# Patient Record
Sex: FEMALE | Race: WHITE | URBAN - METROPOLITAN AREA
[De-identification: names, ages, dates, MRNs, and addresses within clinical notes are randomized per-mention and may not be internally consistent; named-entity substitution may affect disease eponyms.]

---

## 2018-06-19 ENCOUNTER — EMERGENCY (EMERGENCY)
Facility: HOSPITAL | Age: 48
LOS: 0 days | Discharge: HOME | End: 2018-06-19
Admitting: PHYSICIAN ASSISTANT

## 2018-06-19 VITALS
HEART RATE: 82 BPM | TEMPERATURE: 98 F | SYSTOLIC BLOOD PRESSURE: 143 MMHG | RESPIRATION RATE: 20 BRPM | DIASTOLIC BLOOD PRESSURE: 77 MMHG | OXYGEN SATURATION: 99 %

## 2018-06-19 DIAGNOSIS — L23.7 ALLERGIC CONTACT DERMATITIS DUE TO PLANTS, EXCEPT FOOD: ICD-10-CM

## 2018-06-19 DIAGNOSIS — R21 RASH AND OTHER NONSPECIFIC SKIN ERUPTION: ICD-10-CM

## 2018-06-19 RX ORDER — HYDROCORTISONE 1 %
1 OINTMENT (GRAM) TOPICAL
Qty: 50 | Refills: 0 | OUTPATIENT
Start: 2018-06-19

## 2018-06-19 RX ORDER — FAMOTIDINE 10 MG/ML
20 INJECTION INTRAVENOUS DAILY
Qty: 0 | Refills: 0 | Status: DISCONTINUED | OUTPATIENT
Start: 2018-06-19 | End: 2018-06-19

## 2018-06-19 RX ADMIN — FAMOTIDINE 20 MILLIGRAM(S): 10 INJECTION INTRAVENOUS at 07:45

## 2018-06-19 RX ADMIN — Medication 50 MILLIGRAM(S): at 07:45

## 2018-06-19 NOTE — ED PROVIDER NOTE - NS ED ROS FT
CONST: No fever, chills or bodyaches  EYES: No pain, redness, drainage or visual changes.  ENT: No ear pain or discharge, nasal discharge or congestion. No sore throat  CARD: No chest pain, palpitations  RESP: No SOB, cough, hemoptysis. No hx of asthma or COPD  GI: No abdominal pain, N/V/D  MS: No joint pain, back pain or extremity pain/injury  SKIN: +Rash  NEURO: No headache, dizziness, paresthesias or LOC

## 2018-06-19 NOTE — ED PROVIDER NOTE - PHYSICAL EXAMINATION
CONST: Well appearing in NAD  EYES: PERRL, EOMI, Sclera and conjunctiva clear.  ENT: No nasal discharge. TM's clear B/L without drainage. Oropharynx normal appearing, no erythema or exudates. Uvula midline.  NECK: Non-tender, no meningeal signs  CARD: Normal S1 S2; Normal rate and rhythm  RESP: Equal BS B/L, No wheezes, rhonchi or rales. No distress  SKIN: +pustular rash to BL wrists and neck

## 2018-06-19 NOTE — ED PROVIDER NOTE - OBJECTIVE STATEMENT
Pt is a 49 yo F c/o poison ivy to BL wrists and neck after weeding her garden two days ago. Pt has known allergy to poison ivy and sts she usually gets steroids which helps. No fever or chills. No throat swelling or SOB.

## 2018-08-24 ENCOUNTER — EMERGENCY (EMERGENCY)
Facility: HOSPITAL | Age: 48
LOS: 0 days | Discharge: HOME | End: 2018-08-24
Attending: STUDENT IN AN ORGANIZED HEALTH CARE EDUCATION/TRAINING PROGRAM | Admitting: STUDENT IN AN ORGANIZED HEALTH CARE EDUCATION/TRAINING PROGRAM

## 2018-08-24 VITALS
TEMPERATURE: 97 F | OXYGEN SATURATION: 99 % | SYSTOLIC BLOOD PRESSURE: 108 MMHG | HEART RATE: 68 BPM | DIASTOLIC BLOOD PRESSURE: 58 MMHG | RESPIRATION RATE: 18 BRPM

## 2018-08-24 DIAGNOSIS — R10.13 EPIGASTRIC PAIN: ICD-10-CM

## 2018-08-24 DIAGNOSIS — Z90.721 ACQUIRED ABSENCE OF OVARIES, UNILATERAL: ICD-10-CM

## 2018-08-24 DIAGNOSIS — D17.79 BENIGN LIPOMATOUS NEOPLASM OF OTHER SITES: ICD-10-CM

## 2018-08-24 DIAGNOSIS — Z90.49 ACQUIRED ABSENCE OF OTHER SPECIFIED PARTS OF DIGESTIVE TRACT: ICD-10-CM

## 2018-08-24 DIAGNOSIS — R10.31 RIGHT LOWER QUADRANT PAIN: ICD-10-CM

## 2018-08-24 DIAGNOSIS — R11.0 NAUSEA: ICD-10-CM

## 2018-08-24 DIAGNOSIS — R10.9 UNSPECIFIED ABDOMINAL PAIN: ICD-10-CM

## 2018-08-24 DIAGNOSIS — Z79.899 OTHER LONG TERM (CURRENT) DRUG THERAPY: ICD-10-CM

## 2018-08-24 LAB
ALBUMIN SERPL ELPH-MCNC: 4.6 G/DL — SIGNIFICANT CHANGE UP (ref 3.5–5.2)
ALP SERPL-CCNC: 54 U/L — SIGNIFICANT CHANGE UP (ref 30–115)
ALT FLD-CCNC: 23 U/L — SIGNIFICANT CHANGE UP (ref 0–41)
ANION GAP SERPL CALC-SCNC: 15 MMOL/L — HIGH (ref 7–14)
APPEARANCE UR: ABNORMAL
AST SERPL-CCNC: 15 U/L — SIGNIFICANT CHANGE UP (ref 0–41)
BILIRUB SERPL-MCNC: 0.5 MG/DL — SIGNIFICANT CHANGE UP (ref 0.2–1.2)
BILIRUB UR-MCNC: NEGATIVE — SIGNIFICANT CHANGE UP
BUN SERPL-MCNC: 13 MG/DL — SIGNIFICANT CHANGE UP (ref 10–20)
CALCIUM SERPL-MCNC: 9.8 MG/DL — SIGNIFICANT CHANGE UP (ref 8.5–10.1)
CHLORIDE SERPL-SCNC: 100 MMOL/L — SIGNIFICANT CHANGE UP (ref 98–110)
CO2 SERPL-SCNC: 24 MMOL/L — SIGNIFICANT CHANGE UP (ref 17–32)
COLOR SPEC: YELLOW — SIGNIFICANT CHANGE UP
CREAT SERPL-MCNC: 0.8 MG/DL — SIGNIFICANT CHANGE UP (ref 0.7–1.5)
DIFF PNL FLD: NEGATIVE — SIGNIFICANT CHANGE UP
GAS PNL BLDV: SIGNIFICANT CHANGE UP
GLUCOSE SERPL-MCNC: 94 MG/DL — SIGNIFICANT CHANGE UP (ref 70–99)
GLUCOSE UR QL: NEGATIVE MG/DL — SIGNIFICANT CHANGE UP
HCT VFR BLD CALC: 38.9 % — SIGNIFICANT CHANGE UP (ref 37–47)
HGB BLD-MCNC: 13 G/DL — SIGNIFICANT CHANGE UP (ref 12–16)
KETONES UR-MCNC: NEGATIVE — SIGNIFICANT CHANGE UP
LEUKOCYTE ESTERASE UR-ACNC: NEGATIVE — SIGNIFICANT CHANGE UP
LIDOCAIN IGE QN: 23 U/L — SIGNIFICANT CHANGE UP (ref 7–60)
MCHC RBC-ENTMCNC: 29.5 PG — SIGNIFICANT CHANGE UP (ref 27–31)
MCHC RBC-ENTMCNC: 33.4 G/DL — SIGNIFICANT CHANGE UP (ref 32–37)
MCV RBC AUTO: 88.2 FL — SIGNIFICANT CHANGE UP (ref 81–99)
NITRITE UR-MCNC: NEGATIVE — SIGNIFICANT CHANGE UP
NRBC # BLD: 0 /100 WBCS — SIGNIFICANT CHANGE UP (ref 0–0)
PH UR: 6 — SIGNIFICANT CHANGE UP (ref 5–8)
PLATELET # BLD AUTO: 233 K/UL — SIGNIFICANT CHANGE UP (ref 130–400)
POTASSIUM SERPL-MCNC: 4.2 MMOL/L — SIGNIFICANT CHANGE UP (ref 3.5–5)
POTASSIUM SERPL-SCNC: 4.2 MMOL/L — SIGNIFICANT CHANGE UP (ref 3.5–5)
PROT SERPL-MCNC: 6.9 G/DL — SIGNIFICANT CHANGE UP (ref 6–8)
PROT UR-MCNC: NEGATIVE MG/DL — SIGNIFICANT CHANGE UP
RBC # BLD: 4.41 M/UL — SIGNIFICANT CHANGE UP (ref 4.2–5.4)
RBC # FLD: 12.8 % — SIGNIFICANT CHANGE UP (ref 11.5–14.5)
SODIUM SERPL-SCNC: 139 MMOL/L — SIGNIFICANT CHANGE UP (ref 135–146)
SP GR SPEC: 1.02 — SIGNIFICANT CHANGE UP (ref 1.01–1.03)
UROBILINOGEN FLD QL: 0.2 MG/DL — SIGNIFICANT CHANGE UP (ref 0.2–0.2)
WBC # BLD: 6.61 K/UL — SIGNIFICANT CHANGE UP (ref 4.8–10.8)
WBC # FLD AUTO: 6.61 K/UL — SIGNIFICANT CHANGE UP (ref 4.8–10.8)

## 2018-08-24 RX ORDER — FAMOTIDINE 10 MG/ML
20 INJECTION INTRAVENOUS ONCE
Qty: 0 | Refills: 0 | Status: COMPLETED | OUTPATIENT
Start: 2018-08-24 | End: 2018-08-24

## 2018-08-24 RX ORDER — SODIUM CHLORIDE 9 MG/ML
1000 INJECTION, SOLUTION INTRAVENOUS ONCE
Qty: 0 | Refills: 0 | Status: COMPLETED | OUTPATIENT
Start: 2018-08-24 | End: 2018-08-24

## 2018-08-24 RX ADMIN — SODIUM CHLORIDE 1000 MILLILITER(S): 9 INJECTION, SOLUTION INTRAVENOUS at 10:54

## 2018-08-24 RX ADMIN — SODIUM CHLORIDE 1000 MILLILITER(S): 9 INJECTION, SOLUTION INTRAVENOUS at 13:37

## 2018-08-24 NOTE — ED PROVIDER NOTE - OBJECTIVE STATEMENT
47yo F hx cholecystectomy R oopherectomy otherwise healthy pw RLQ pain- started 2hrs ago, constant, radiates to R flank and epigastrium, +nausea, no vomiting- no fevers/chills, chest pain, shortness of breath, other abdominal surgeries, no dysuria/hematuria, hx nephrolithiasis

## 2018-08-24 NOTE — ED PROVIDER NOTE - ATTENDING CONTRIBUTION TO CARE
48 hx choly, oophorectomy c/o R flank pain. pain flank sometimes to RUQ. no fever, no chills. nausea w/o vomiting. no urinary sx. no hx renal colic.    vss  exam  uncomfortable appearing  card-rrr  lungs-ctab  abdo- epigastric and RLQ tednerness, no cvat      belly labs, ctap, gi cocktail

## 2018-08-24 NOTE — ED PROVIDER NOTE - PHYSICAL EXAMINATION
Uncomfortable appearing. NCAT EOMI conjunctiva nml. No nasal discharge. MMM. Neck supple, non tender, full ROM. RRR no MRG +S1S2. CTA b/l. Abd s +RLQ and epigastric ttp no rebound no guarding ND +BS. no CVA tenderness b/l Ext WWP x4, moving all extremities, no edema. 2+ equal pulses throughout. Cooperative, appropriate.

## 2018-08-24 NOTE — ED PROVIDER NOTE - PROGRESS NOTE DETAILS
Pt aware of all results, given a copy of all results, comfortable with d/c and f/u outpatient, return precautions given, no further questions or concerns at this time

## 2018-08-24 NOTE — ED PROVIDER NOTE - NS ED ROS FT
Constitutional:  See HPI.   Eyes:  No visual changes, eye pain or discharge.  ENMT:  No hearing changes, pain, discharge or infections. No neck pain or stiffness.  Cardiac:  No chest pain, SOB or edema. No chest pain with exertion.  Respiratory:  No cough or respiratory distress. No hemoptysis.  GI:  No vomiting, diarrhea  :  No dysuria, frequency, hematuria  MS:  No joint pain   Neuro:  No LOC. No headache or weakness.    Skin:  No skin rash.

## 2018-08-24 NOTE — ED ADULT NURSE NOTE - NSIMPLEMENTINTERV_GEN_ALL_ED
Implemented All Universal Safety Interventions:  [place] to call system. Call bell, personal items and telephone within reach. Instruct patient to call for assistance. Room bathroom lighting operational. Non-slip footwear when patient is off stretcher. Physically safe environment: no spills, clutter or unnecessary equipment. Stretcher in lowest position, wheels locked, appropriate side rails in place.

## 2018-08-25 LAB
CULTURE RESULTS: SIGNIFICANT CHANGE UP
SPECIMEN SOURCE: SIGNIFICANT CHANGE UP

## 2019-03-23 ENCOUNTER — OUTPATIENT (OUTPATIENT)
Dept: OUTPATIENT SERVICES | Facility: HOSPITAL | Age: 49
LOS: 1 days | Discharge: HOME | End: 2019-03-23

## 2019-03-23 DIAGNOSIS — Z12.31 ENCOUNTER FOR SCREENING MAMMOGRAM FOR MALIGNANT NEOPLASM OF BREAST: ICD-10-CM

## 2019-03-25 DIAGNOSIS — F17.200 NICOTINE DEPENDENCE, UNSPECIFIED, UNCOMPLICATED: ICD-10-CM

## 2019-03-25 DIAGNOSIS — N95.9 UNSPECIFIED MENOPAUSAL AND PERIMENOPAUSAL DISORDER: ICD-10-CM

## 2019-03-25 DIAGNOSIS — Z13.820 ENCOUNTER FOR SCREENING FOR OSTEOPOROSIS: ICD-10-CM

## 2019-04-11 ENCOUNTER — OUTPATIENT (OUTPATIENT)
Dept: OUTPATIENT SERVICES | Facility: HOSPITAL | Age: 49
LOS: 1 days | Discharge: HOME | End: 2019-04-11
Payer: COMMERCIAL

## 2019-04-11 DIAGNOSIS — R92.8 OTHER ABNORMAL AND INCONCLUSIVE FINDINGS ON DIAGNOSTIC IMAGING OF BREAST: ICD-10-CM

## 2019-04-11 PROCEDURE — 77065 DX MAMMO INCL CAD UNI: CPT | Mod: 26,LT

## 2019-04-11 PROCEDURE — G0279: CPT | Mod: 26,LT

## 2019-04-11 PROCEDURE — 76642 ULTRASOUND BREAST LIMITED: CPT | Mod: 26,LT

## 2019-06-02 ENCOUNTER — INPATIENT (INPATIENT)
Facility: HOSPITAL | Age: 49
LOS: 0 days | Discharge: HOME | End: 2019-06-03
Attending: INTERNAL MEDICINE | Admitting: INTERNAL MEDICINE
Payer: COMMERCIAL

## 2019-06-02 VITALS
SYSTOLIC BLOOD PRESSURE: 133 MMHG | RESPIRATION RATE: 20 BRPM | HEART RATE: 100 BPM | OXYGEN SATURATION: 97 % | TEMPERATURE: 100 F | DIASTOLIC BLOOD PRESSURE: 74 MMHG

## 2019-06-02 DIAGNOSIS — Z90.49 ACQUIRED ABSENCE OF OTHER SPECIFIED PARTS OF DIGESTIVE TRACT: Chronic | ICD-10-CM

## 2019-06-02 LAB
ANION GAP SERPL CALC-SCNC: 15 MMOL/L — HIGH (ref 7–14)
BASOPHILS # BLD AUTO: 0.02 K/UL — SIGNIFICANT CHANGE UP (ref 0–0.2)
BASOPHILS NFR BLD AUTO: 0.2 % — SIGNIFICANT CHANGE UP (ref 0–1)
BUN SERPL-MCNC: 12 MG/DL — SIGNIFICANT CHANGE UP (ref 10–20)
CALCIUM SERPL-MCNC: 9.4 MG/DL — SIGNIFICANT CHANGE UP (ref 8.5–10.1)
CHLORIDE SERPL-SCNC: 103 MMOL/L — SIGNIFICANT CHANGE UP (ref 98–110)
CO2 SERPL-SCNC: 24 MMOL/L — SIGNIFICANT CHANGE UP (ref 17–32)
CREAT SERPL-MCNC: 0.9 MG/DL — SIGNIFICANT CHANGE UP (ref 0.7–1.5)
EOSINOPHIL # BLD AUTO: 0.33 K/UL — SIGNIFICANT CHANGE UP (ref 0–0.7)
EOSINOPHIL NFR BLD AUTO: 3.8 % — SIGNIFICANT CHANGE UP (ref 0–8)
GLUCOSE SERPL-MCNC: 112 MG/DL — HIGH (ref 70–99)
HCT VFR BLD CALC: 41.1 % — SIGNIFICANT CHANGE UP (ref 37–47)
HGB BLD-MCNC: 13.8 G/DL — SIGNIFICANT CHANGE UP (ref 12–16)
IMM GRANULOCYTES NFR BLD AUTO: 0.5 % — HIGH (ref 0.1–0.3)
LACTATE SERPL-SCNC: 2.3 MMOL/L — HIGH (ref 0.5–2.2)
LYMPHOCYTES # BLD AUTO: 1.12 K/UL — LOW (ref 1.2–3.4)
LYMPHOCYTES # BLD AUTO: 12.8 % — LOW (ref 20.5–51.1)
MCHC RBC-ENTMCNC: 29.7 PG — SIGNIFICANT CHANGE UP (ref 27–31)
MCHC RBC-ENTMCNC: 33.6 G/DL — SIGNIFICANT CHANGE UP (ref 32–37)
MCV RBC AUTO: 88.6 FL — SIGNIFICANT CHANGE UP (ref 81–99)
MONOCYTES # BLD AUTO: 0.74 K/UL — HIGH (ref 0.1–0.6)
MONOCYTES NFR BLD AUTO: 8.5 % — SIGNIFICANT CHANGE UP (ref 1.7–9.3)
NEUTROPHILS # BLD AUTO: 6.5 K/UL — SIGNIFICANT CHANGE UP (ref 1.4–6.5)
NEUTROPHILS NFR BLD AUTO: 74.2 % — SIGNIFICANT CHANGE UP (ref 42.2–75.2)
NRBC # BLD: 0 /100 WBCS — SIGNIFICANT CHANGE UP (ref 0–0)
PLATELET # BLD AUTO: 211 K/UL — SIGNIFICANT CHANGE UP (ref 130–400)
POTASSIUM SERPL-MCNC: 4.4 MMOL/L — SIGNIFICANT CHANGE UP (ref 3.5–5)
POTASSIUM SERPL-SCNC: 4.4 MMOL/L — SIGNIFICANT CHANGE UP (ref 3.5–5)
RBC # BLD: 4.64 M/UL — SIGNIFICANT CHANGE UP (ref 4.2–5.4)
RBC # FLD: 13 % — SIGNIFICANT CHANGE UP (ref 11.5–14.5)
SODIUM SERPL-SCNC: 142 MMOL/L — SIGNIFICANT CHANGE UP (ref 135–146)
WBC # BLD: 8.75 K/UL — SIGNIFICANT CHANGE UP (ref 4.8–10.8)
WBC # FLD AUTO: 8.75 K/UL — SIGNIFICANT CHANGE UP (ref 4.8–10.8)

## 2019-06-02 PROCEDURE — 99284 EMERGENCY DEPT VISIT MOD MDM: CPT

## 2019-06-02 PROCEDURE — 93010 ELECTROCARDIOGRAM REPORT: CPT

## 2019-06-02 PROCEDURE — 73080 X-RAY EXAM OF ELBOW: CPT | Mod: 26,RT

## 2019-06-02 RX ORDER — SODIUM CHLORIDE 9 MG/ML
3 INJECTION INTRAMUSCULAR; INTRAVENOUS; SUBCUTANEOUS EVERY 8 HOURS
Refills: 0 | Status: DISCONTINUED | OUTPATIENT
Start: 2019-06-02 | End: 2019-06-03

## 2019-06-02 RX ORDER — ACETAMINOPHEN 500 MG
975 TABLET ORAL ONCE
Refills: 0 | Status: COMPLETED | OUTPATIENT
Start: 2019-06-02 | End: 2019-06-02

## 2019-06-02 RX ORDER — ENOXAPARIN SODIUM 100 MG/ML
40 INJECTION SUBCUTANEOUS DAILY
Refills: 0 | Status: DISCONTINUED | OUTPATIENT
Start: 2019-06-02 | End: 2019-06-03

## 2019-06-02 RX ORDER — IBUPROFEN 200 MG
600 TABLET ORAL ONCE
Refills: 0 | Status: COMPLETED | OUTPATIENT
Start: 2019-06-02 | End: 2019-06-02

## 2019-06-02 RX ORDER — AMPICILLIN SODIUM AND SULBACTAM SODIUM 250; 125 MG/ML; MG/ML
3 INJECTION, POWDER, FOR SUSPENSION INTRAMUSCULAR; INTRAVENOUS ONCE
Refills: 0 | Status: COMPLETED | OUTPATIENT
Start: 2019-06-02 | End: 2019-06-02

## 2019-06-02 RX ORDER — PANTOPRAZOLE SODIUM 20 MG/1
40 TABLET, DELAYED RELEASE ORAL
Refills: 0 | Status: DISCONTINUED | OUTPATIENT
Start: 2019-06-02 | End: 2019-06-03

## 2019-06-02 RX ORDER — SODIUM CHLORIDE 9 MG/ML
1000 INJECTION INTRAMUSCULAR; INTRAVENOUS; SUBCUTANEOUS ONCE
Refills: 0 | Status: COMPLETED | OUTPATIENT
Start: 2019-06-02 | End: 2019-06-02

## 2019-06-02 RX ORDER — SODIUM CHLORIDE 9 MG/ML
1000 INJECTION INTRAMUSCULAR; INTRAVENOUS; SUBCUTANEOUS
Refills: 0 | Status: DISCONTINUED | OUTPATIENT
Start: 2019-06-02 | End: 2019-06-03

## 2019-06-02 RX ADMIN — SODIUM CHLORIDE 1000 MILLILITER(S): 9 INJECTION INTRAMUSCULAR; INTRAVENOUS; SUBCUTANEOUS at 19:20

## 2019-06-02 RX ADMIN — SODIUM CHLORIDE 50 MILLILITER(S): 9 INJECTION INTRAMUSCULAR; INTRAVENOUS; SUBCUTANEOUS at 23:34

## 2019-06-02 RX ADMIN — Medication 600 MILLIGRAM(S): at 19:00

## 2019-06-02 RX ADMIN — SODIUM CHLORIDE 1000 MILLILITER(S): 9 INJECTION INTRAMUSCULAR; INTRAVENOUS; SUBCUTANEOUS at 19:18

## 2019-06-02 RX ADMIN — Medication 975 MILLIGRAM(S): at 19:20

## 2019-06-02 RX ADMIN — SODIUM CHLORIDE 3 MILLILITER(S): 9 INJECTION INTRAMUSCULAR; INTRAVENOUS; SUBCUTANEOUS at 19:19

## 2019-06-02 RX ADMIN — AMPICILLIN SODIUM AND SULBACTAM SODIUM 3 GRAM(S): 250; 125 INJECTION, POWDER, FOR SUSPENSION INTRAMUSCULAR; INTRAVENOUS at 19:19

## 2019-06-02 RX ADMIN — Medication 975 MILLIGRAM(S): at 19:15

## 2019-06-02 RX ADMIN — AMPICILLIN SODIUM AND SULBACTAM SODIUM 200 GRAM(S): 250; 125 INJECTION, POWDER, FOR SUSPENSION INTRAMUSCULAR; INTRAVENOUS at 18:11

## 2019-06-02 RX ADMIN — Medication 600 MILLIGRAM(S): at 17:34

## 2019-06-02 NOTE — ED PROVIDER NOTE - OBJECTIVE STATEMENT
48 y/o female no PMhx presents to the ED c/o "I was cleaning weeds in my backyard 2 days ago. I noticed blisters to my right forearm yesterday. I tried to use alcohol to make it better. Today the redness and swelling got much worse. I have streaking going up my arm. I had fever 100.9 and chills today." no numbness/ tingling/ trauma

## 2019-06-02 NOTE — ED PROVIDER NOTE - CLINICAL SUMMARY MEDICAL DECISION MAKING FREE TEXT BOX
RUE poison ivy rash w/superimposed cellulitis & lymphangitis - pt febrile, elevated LA - admitted for IV abx and further mgmt

## 2019-06-02 NOTE — H&P ADULT - ATTENDING COMMENTS
pt seen and examined indep, I have read and agree with above,    rue ant arm with erythema and bullous lesions  contact dermatiis  poisin ivy    po prednisone  dc home d/ who  fu pmd

## 2019-06-02 NOTE — H&P ADULT - ASSESSMENT
50 y/o FM w/ PMhx of Migraines presents with 1 day hx of rash with bullous lesions in her right elbow. Pt states that she was attending to her garden yesterday morning which contains poison ivy and she accidentally brushed her arm against it and on the same day evening developed a rash w/ bullous lesion draining serous fluid in her right elbow. she also noticed a fever of 100.9F at home. she also noticed surrounding inflammation in the right arm which brought her to the hospital. Denies vomiting, diarrhea, chills, sob, dizziness, confusion, chest pain, abdominal pain or lethargy. Pt states she cleaned the site with rubbing alcohol    #Right upper extremity bullous rash w/o lymphadenopathy or streaking - likely Toxicodendron  - no evidence of sepsis  - LA 2.3 - possibly d/t hypovolemia vs allergic reaction - Given IV hydration  - will start pt on prednisone. Uptodate states a slow taper is preferable - 60 mg/day the first week, 40 mg/day the second week, 20 mg/day the third week would be one possible taper for a 21-day course  - will hold off antibiotics as no evidence of lymphadenopathy, or suggestion of cellulitis. Fever at home likely d/t SIRS reaction  - will get burn eval as pt is requesting if bullous lesion can be drained   - c/w cold compress  - f/u ESR/CRP  - f/u Blood cultures  - If drained by Burn, f/u wound cultures     #Left lower extremity rash w/ central punctum s/p removal of a thorn   - PE: no surrounding lymphadenopathy or streaking. Only central rash without purulent drainage  - c/w local wound care      #Activity: OOBc  #Diet: Regular  #DVT/GI PPX: Lovenox/Protonix  #Code: Full  #CHG bath

## 2019-06-02 NOTE — ED PROVIDER NOTE - CARE PLAN
Principal Discharge DX:	Right arm cellulitis  Secondary Diagnosis:	Lymphangitis  Secondary Diagnosis:	Elevated lactic acid level

## 2019-06-02 NOTE — H&P ADULT - ADDITIONAL PE
right upper extremity erythema w/ warmth and pruritus. Bullous lesion in right arm draining serous fluids. no surrounding lymphadenopathy

## 2019-06-02 NOTE — H&P ADULT - HISTORY OF PRESENT ILLNESS
48 y/o FM w/ PMhx of Migraines presents with 1 day hx of rash with bullous lesions in her right elbow. Pt states that she was attending to her garden yesterday morning which contains poison ivy and she accidentally brushed her arm against it and on the same day evening developed a rash w/ bullous lesion draining serous fluid in her right elbow. she also noticed a fever of 100.9F at home. she also noticed surrounding inflammation in the right arm which brought her to the hospital. Denies vomiting, diarrhea, chills, sob, dizziness, confusion, chest pain, abdominal pain or lethargy. Pt states she cleaned the site with rubbing alcohol    In the ED,  Vital Signs Last 24 Hrs  T(C): 38 (02 Jun 2019 19:03), Max: 39.1 (02 Jun 2019 18:01)  T(F): 100.4 (02 Jun 2019 19:03), Max: 102.4 (02 Jun 2019 18:01)  HR: 100 (02 Jun 2019 16:57) (100 - 100)  BP: 133/74 (02 Jun 2019 16:57) (133/74 - 133/74)  BP(mean): --  RR: 20 (02 Jun 2019 16:57) (20 - 20)  SpO2: 97% (02 Jun 2019 16:57) (97% - 97%)  Given: Tylenol x1, unasyn x1, 2L NS bolus

## 2019-06-02 NOTE — ED PROVIDER NOTE - ATTENDING CONTRIBUTION TO CARE
49y f no pmh p/w rash to R forearm. Was pulling up weeds in her backyard 2d ago, dvlpd blisters to forearm yest. Has known poison ivy in backyard. Now w/incr erythema/edema around blisters, fever 100.9 orally & chills. Denies cp/sob. nv, abd pain, focal numbness or weakness. PMD [place]. PE: nad, ncat, neck supple, rrr nl s1s2 no mrg, ctab no wrr, abd soft ntnd, RUE- +poison ivy blisters to R forearm w/surrounding erythema & short area of prox lymphangitis, RUE NVI throughout, remainder of skin & ext exam nl.

## 2019-06-02 NOTE — ED ADULT NURSE NOTE - OBJECTIVE STATEMENT
Pt presents with right arm skin redness with blisters for 3 days. (+) ROM. Pt is a health care worker

## 2019-06-03 ENCOUNTER — TRANSCRIPTION ENCOUNTER (OUTPATIENT)
Age: 49
End: 2019-06-03

## 2019-06-03 VITALS
RESPIRATION RATE: 18 BRPM | HEART RATE: 64 BPM | SYSTOLIC BLOOD PRESSURE: 97 MMHG | DIASTOLIC BLOOD PRESSURE: 54 MMHG | TEMPERATURE: 97 F

## 2019-06-03 LAB
ANION GAP SERPL CALC-SCNC: 11 MMOL/L — SIGNIFICANT CHANGE UP (ref 7–14)
BASOPHILS # BLD AUTO: 0.01 K/UL — SIGNIFICANT CHANGE UP (ref 0–0.2)
BASOPHILS NFR BLD AUTO: 0.2 % — SIGNIFICANT CHANGE UP (ref 0–1)
BUN SERPL-MCNC: 7 MG/DL — LOW (ref 10–20)
CALCIUM SERPL-MCNC: 8.3 MG/DL — LOW (ref 8.5–10.1)
CHLORIDE SERPL-SCNC: 110 MMOL/L — SIGNIFICANT CHANGE UP (ref 98–110)
CO2 SERPL-SCNC: 23 MMOL/L — SIGNIFICANT CHANGE UP (ref 17–32)
CREAT SERPL-MCNC: 0.7 MG/DL — SIGNIFICANT CHANGE UP (ref 0.7–1.5)
CRP SERPL-MCNC: 7.36 MG/DL — HIGH (ref 0–0.4)
EOSINOPHIL # BLD AUTO: 0.39 K/UL — SIGNIFICANT CHANGE UP (ref 0–0.7)
EOSINOPHIL NFR BLD AUTO: 6.3 % — SIGNIFICANT CHANGE UP (ref 0–8)
ERYTHROCYTE [SEDIMENTATION RATE] IN BLOOD: 27 MM/HR — HIGH (ref 0–20)
GLUCOSE SERPL-MCNC: 114 MG/DL — HIGH (ref 70–99)
HCT VFR BLD CALC: 35.2 % — LOW (ref 37–47)
HGB BLD-MCNC: 11.6 G/DL — LOW (ref 12–16)
IMM GRANULOCYTES NFR BLD AUTO: 0.2 % — SIGNIFICANT CHANGE UP (ref 0.1–0.3)
LACTATE SERPL-SCNC: 0.9 MMOL/L — SIGNIFICANT CHANGE UP (ref 0.5–2.2)
LYMPHOCYTES # BLD AUTO: 1.44 K/UL — SIGNIFICANT CHANGE UP (ref 1.2–3.4)
LYMPHOCYTES # BLD AUTO: 23.2 % — SIGNIFICANT CHANGE UP (ref 20.5–51.1)
MCHC RBC-ENTMCNC: 29.6 PG — SIGNIFICANT CHANGE UP (ref 27–31)
MCHC RBC-ENTMCNC: 33 G/DL — SIGNIFICANT CHANGE UP (ref 32–37)
MCV RBC AUTO: 89.8 FL — SIGNIFICANT CHANGE UP (ref 81–99)
MONOCYTES # BLD AUTO: 0.69 K/UL — HIGH (ref 0.1–0.6)
MONOCYTES NFR BLD AUTO: 11.1 % — HIGH (ref 1.7–9.3)
NEUTROPHILS # BLD AUTO: 3.68 K/UL — SIGNIFICANT CHANGE UP (ref 1.4–6.5)
NEUTROPHILS NFR BLD AUTO: 59 % — SIGNIFICANT CHANGE UP (ref 42.2–75.2)
NRBC # BLD: 0 /100 WBCS — SIGNIFICANT CHANGE UP (ref 0–0)
PLATELET # BLD AUTO: 165 K/UL — SIGNIFICANT CHANGE UP (ref 130–400)
POTASSIUM SERPL-MCNC: 3.7 MMOL/L — SIGNIFICANT CHANGE UP (ref 3.5–5)
POTASSIUM SERPL-SCNC: 3.7 MMOL/L — SIGNIFICANT CHANGE UP (ref 3.5–5)
RBC # BLD: 3.92 M/UL — LOW (ref 4.2–5.4)
RBC # FLD: 13 % — SIGNIFICANT CHANGE UP (ref 11.5–14.5)
SODIUM SERPL-SCNC: 144 MMOL/L — SIGNIFICANT CHANGE UP (ref 135–146)
WBC # BLD: 6.22 K/UL — SIGNIFICANT CHANGE UP (ref 4.8–10.8)
WBC # FLD AUTO: 6.22 K/UL — SIGNIFICANT CHANGE UP (ref 4.8–10.8)

## 2019-06-03 RX ORDER — IBUPROFEN 200 MG
400 TABLET ORAL ONCE
Refills: 0 | Status: COMPLETED | OUTPATIENT
Start: 2019-06-03 | End: 2019-06-03

## 2019-06-03 RX ORDER — CHLORHEXIDINE GLUCONATE 213 G/1000ML
1 SOLUTION TOPICAL
Refills: 0 | Status: DISCONTINUED | OUTPATIENT
Start: 2019-06-03 | End: 2019-06-03

## 2019-06-03 RX ADMIN — Medication 400 MILLIGRAM(S): at 00:48

## 2019-06-03 RX ADMIN — SODIUM CHLORIDE 3 MILLILITER(S): 9 INJECTION INTRAMUSCULAR; INTRAVENOUS; SUBCUTANEOUS at 05:06

## 2019-06-03 RX ADMIN — Medication 400 MILLIGRAM(S): at 00:18

## 2019-06-03 RX ADMIN — Medication 60 MILLIGRAM(S): at 05:06

## 2019-06-03 RX ADMIN — PANTOPRAZOLE SODIUM 40 MILLIGRAM(S): 20 TABLET, DELAYED RELEASE ORAL at 05:05

## 2019-06-03 NOTE — MEDICAL STUDENT PROGRESS NOTE(EDUCATION) - NS MD HP STUD ASPLAN ASSES FT
50 y/o FM w/ PMhx of Migraines presents with 1 day hx of rash with bullous lesions in her right elbow. Pt states that she was attending to her garden which contains poison ivy and she accidentally brushed her arm against it and on the same day evening developed a rash w/ bullous lesion draining serous fluid in her right elbow. She also noticed a fever of 100.9F at home.

## 2019-06-03 NOTE — DISCHARGE NOTE NURSING/CASE MANAGEMENT/SOCIAL WORK - NSDCPEEMAIL_GEN_ALL_CORE
[facility] for Tobacco Control email [email or web address]

## 2019-06-03 NOTE — MEDICAL STUDENT PROGRESS NOTE(EDUCATION) - SUBJECTIVE AND OBJECTIVE BOX
SUBJECTIVE:    48 y/o F PMH Migraines presents with 1 day hx of rash with bullous lesions on her right elbow (02-Jun-2019 20:14).  Patient is currently admitted to Medicine with a primary diagnosis of Right arm cellulitis  Today is hospital day 1, pt is laying comfortable in bed. Pt has no overnight complaints, states her rash has gotten smaller and not as itchy.  Patient denies N/V/D, fevers, chills, SOB, Chest pain, abdominal pain, dysuria.    PAST MEDICAL HISTORY:  Migraines    PAST SURGICAL HISTORY:  S/P cholecystectomy.     FAMILY HISTORY:  FH: lung cancer.    SOCIAL HISTORY:  Pt is a smoker smoker, denies alcohol and drug abuse.    ALLERGIES:  No known allergies    MEDICATIONS:

## 2019-06-03 NOTE — MEDICAL STUDENT PROGRESS NOTE(EDUCATION) - NS MD HP STUD ASPLAN PLAN FT
#Right upper extremity bullous rash w/o lymphadenopathy or streaking - likely Toxicodendron  - no evidence of sepsis  - LA 2.3 - possibly d/t hypovolemia vs allergic reaction - Given IV hydration, lactate today 0.9 down from 2.3  - pt started on prednisone. Uptodate states a slow taper is preferable - 60 mg/day the first week, 40 mg/day the second week, 20 mg/day the third week would be one possible taper for a 21-day course. Will clarify dosing and length.  - will hold off antibiotics as no evidence of lymphadenopathy, or suggestion of cellulitis. Fever at home likely d/t SIRS reaction. Pt's temp this morning 97.1  - Cancel burn consult, as pt states she is doing much better, swelling and erythema have decreased in both her R arm and L leg.   - c/w cold compress  - f/u ESR/CRP  - f/u Blood cultures  - D/C today    #Left lower extremity rash w/ central punctum s/p removal of a thorn   - PE: no surrounding lymphadenopathy or streaking. Only central rash without purulent drainage  - c/w local wound care      #Activity: OOBc  #Diet: Regular  #DVT/GI PPX: Lovenox/Protonix  #Code: Full  #CHG bath

## 2019-06-03 NOTE — DISCHARGE NOTE PROVIDER - CARE PROVIDER_API CALL
Jones Hilario (DO)  Infectious Disease; Internal Medicine  [address] [place] [address]  Phone: [phone]  Fax: [phone]  Follow Up Time: Routine

## 2019-06-03 NOTE — DISCHARGE NOTE NURSING/CASE MANAGEMENT/SOCIAL WORK - NSDCDPATPORTLINK_GEN_ALL_CORE
You can access the [facility] Patient Portal, offered by [facility], by registering with the following website: [email or web address]

## 2019-06-03 NOTE — MEDICAL STUDENT PROGRESS NOTE(EDUCATION) - NEGATIVE NEUROLOGICAL SYMPTOMS
no weakness/no facial palsy/no loss of consciousness/no paresthesias/no confusion/no transient paralysis

## 2019-06-03 NOTE — DISCHARGE NOTE PROVIDER - NSDCCPCAREPLAN_GEN_ALL_CORE_FT
PRINCIPAL DISCHARGE DIAGNOSIS  Diagnosis: Contact dermatitis  Assessment and Plan of Treatment: you have been admitted with contact dermatitis with high lactic acid in the blood.  you have been treated with steriods and IV fluids.  take medications as prescribed and follow up with your PMD.

## 2019-06-03 NOTE — MEDICAL STUDENT PROGRESS NOTE(EDUCATION) - RS GEN PE MLT RESP DETAILS PC
no wheezes/airway patent/no rales/no rhonchi/no intercostal retractions/breath sounds equal/clear to auscultation bilaterally

## 2019-06-03 NOTE — MEDICAL STUDENT PROGRESS NOTE(EDUCATION) - MS EXT PE MLT D E PC
+bullous and rash on right anticubital fossa, and + Left leg rash/no cyanosis/no clubbing/no pedal edema

## 2019-06-03 NOTE — DISCHARGE NOTE PROVIDER - HOSPITAL COURSE
48 y/o FM w/ PMhx of Migraines presents with 1 day hx of rash with bullous lesions in her right elbow. Pt states that she was attending to her garden yesterday morning which contains poison ivy and she accidentally brushed her arm against it and on the same day evening developed a rash w/ bullous lesion draining serous fluid in her right elbow. she also noticed a fever of 100.9F at home. she also noticed surrounding inflammation in the right arm which brought her to the hospital. Denies vomiting, diarrhea, chills, sob, dizziness, confusion, chest pain, abdominal pain or lethargy. Pt states she cleaned the site with rubbing alcohol.    pt admitted to medicine.    started on IV fluids, started on PO steroids.    pt's clinical condition is stable.    pt is cleared to be discharged by Medicine attending to follow up with PMD.

## 2019-06-03 NOTE — DISCHARGE NOTE NURSING/CASE MANAGEMENT/SOCIAL WORK - NSDCPEWEB_GEN_ALL_CORE
[facility] for Tobacco Control website --- [email or web address] --- [email or web address]

## 2019-06-06 DIAGNOSIS — R65.10 SYSTEMIC INFLAMMATORY RESPONSE SYNDROME (SIRS) OF NON-INFECTIOUS ORIGIN WITHOUT ACUTE ORGAN DYSFUNCTION: ICD-10-CM

## 2019-06-06 DIAGNOSIS — L23.7 ALLERGIC CONTACT DERMATITIS DUE TO PLANTS, EXCEPT FOOD: ICD-10-CM

## 2019-06-06 DIAGNOSIS — R74.0 NONSPECIFIC ELEVATION OF LEVELS OF TRANSAMINASE AND LACTIC ACID DEHYDROGENASE [LDH]: ICD-10-CM

## 2019-06-06 DIAGNOSIS — M79.603 PAIN IN ARM, UNSPECIFIED: ICD-10-CM

## 2019-06-06 DIAGNOSIS — Z87.891 PERSONAL HISTORY OF NICOTINE DEPENDENCE: ICD-10-CM

## 2019-06-06 DIAGNOSIS — R50.9 FEVER, UNSPECIFIED: ICD-10-CM

## 2019-06-08 LAB
CULTURE RESULTS: SIGNIFICANT CHANGE UP
SPECIMEN SOURCE: SIGNIFICANT CHANGE UP

## 2019-09-06 ENCOUNTER — OUTPATIENT (OUTPATIENT)
Dept: OUTPATIENT SERVICES | Facility: HOSPITAL | Age: 49
LOS: 1 days | Discharge: HOME | End: 2019-09-06
Payer: COMMERCIAL

## 2019-09-06 DIAGNOSIS — Z90.49 ACQUIRED ABSENCE OF OTHER SPECIFIED PARTS OF DIGESTIVE TRACT: Chronic | ICD-10-CM

## 2019-09-06 DIAGNOSIS — D17.1 BENIGN LIPOMATOUS NEOPLASM OF SKIN AND SUBCUTANEOUS TISSUE OF TRUNK: ICD-10-CM

## 2019-09-06 PROCEDURE — 74176 CT ABD & PELVIS W/O CONTRAST: CPT | Mod: 26

## 2019-10-09 ENCOUNTER — OUTPATIENT (OUTPATIENT)
Dept: OUTPATIENT SERVICES | Facility: HOSPITAL | Age: 49
LOS: 1 days | Discharge: HOME | End: 2019-10-09

## 2019-10-09 DIAGNOSIS — Z90.49 ACQUIRED ABSENCE OF OTHER SPECIFIED PARTS OF DIGESTIVE TRACT: Chronic | ICD-10-CM

## 2019-10-09 DIAGNOSIS — Z01.818 ENCOUNTER FOR OTHER PREPROCEDURAL EXAMINATION: ICD-10-CM

## 2020-04-26 ENCOUNTER — MESSAGE (OUTPATIENT)
Age: 50
End: 2020-04-26

## 2020-05-02 ENCOUNTER — APPOINTMENT (OUTPATIENT)
Dept: DISASTER EMERGENCY | Facility: HOSPITAL | Age: 50
End: 2020-05-02

## 2020-05-04 LAB
SARS-COV-2 IGG SERPL IA-ACNC: 7.3 INDEX
SARS-COV-2 IGG SERPL QL IA: POSITIVE

## 2020-07-07 ENCOUNTER — OUTPATIENT (OUTPATIENT)
Dept: OUTPATIENT SERVICES | Facility: HOSPITAL | Age: 50
LOS: 1 days | Discharge: HOME | End: 2020-07-07
Payer: COMMERCIAL

## 2020-07-07 DIAGNOSIS — Z90.49 ACQUIRED ABSENCE OF OTHER SPECIFIED PARTS OF DIGESTIVE TRACT: Chronic | ICD-10-CM

## 2020-07-07 DIAGNOSIS — R92.8 OTHER ABNORMAL AND INCONCLUSIVE FINDINGS ON DIAGNOSTIC IMAGING OF BREAST: ICD-10-CM

## 2020-07-07 PROCEDURE — G0279: CPT | Mod: 26

## 2020-07-07 PROCEDURE — 76642 ULTRASOUND BREAST LIMITED: CPT | Mod: 26,LT

## 2020-07-07 PROCEDURE — 77066 DX MAMMO INCL CAD BI: CPT | Mod: 26

## 2020-08-03 PROBLEM — Z00.00 ENCOUNTER FOR PREVENTIVE HEALTH EXAMINATION: Status: ACTIVE | Noted: 2020-08-03

## 2020-10-18 ENCOUNTER — TRANSCRIPTION ENCOUNTER (OUTPATIENT)
Age: 50
End: 2020-10-18

## 2021-01-11 ENCOUNTER — OUTPATIENT (OUTPATIENT)
Dept: OUTPATIENT SERVICES | Facility: HOSPITAL | Age: 51
LOS: 1 days | Discharge: HOME | End: 2021-01-11
Payer: COMMERCIAL

## 2021-01-11 DIAGNOSIS — Z90.49 ACQUIRED ABSENCE OF OTHER SPECIFIED PARTS OF DIGESTIVE TRACT: Chronic | ICD-10-CM

## 2021-01-11 DIAGNOSIS — R92.8 OTHER ABNORMAL AND INCONCLUSIVE FINDINGS ON DIAGNOSTIC IMAGING OF BREAST: ICD-10-CM

## 2021-01-11 PROCEDURE — 77065 DX MAMMO INCL CAD UNI: CPT | Mod: 26,LT

## 2021-01-11 PROCEDURE — 76642 ULTRASOUND BREAST LIMITED: CPT | Mod: 26,LT

## 2021-01-11 PROCEDURE — G0279: CPT | Mod: 26

## 2021-03-29 DIAGNOSIS — U07.1 COVID-19: ICD-10-CM

## 2021-03-30 ENCOUNTER — APPOINTMENT (OUTPATIENT)
Dept: BREAST CENTER | Facility: CLINIC | Age: 51
End: 2021-03-30
Payer: COMMERCIAL

## 2021-03-30 VITALS
DIASTOLIC BLOOD PRESSURE: 82 MMHG | HEIGHT: 67 IN | BODY MASS INDEX: 34.53 KG/M2 | TEMPERATURE: 97.4 F | SYSTOLIC BLOOD PRESSURE: 132 MMHG | WEIGHT: 220 LBS

## 2021-03-30 DIAGNOSIS — G43.909 MIGRAINE, UNSPECIFIED, NOT INTRACTABLE, W/OUT STATUS MIGRAINOSUS: ICD-10-CM

## 2021-03-30 DIAGNOSIS — Z87.891 PERSONAL HISTORY OF NICOTINE DEPENDENCE: ICD-10-CM

## 2021-03-30 DIAGNOSIS — R92.8 OTHER ABNORMAL AND INCONCLUSIVE FINDINGS ON DIAGNOSTIC IMAGING OF BREAST: ICD-10-CM

## 2021-03-30 DIAGNOSIS — Z78.9 OTHER SPECIFIED HEALTH STATUS: ICD-10-CM

## 2021-03-30 DIAGNOSIS — N60.19 DIFFUSE CYSTIC MASTOPATHY OF UNSPECIFIED BREAST: ICD-10-CM

## 2021-03-30 LAB
COVID-19 SPIKE DOMAIN ANTIBODY INTERPRETATION: POSITIVE
SARS-COV-2 AB SERPL IA-ACNC: >250 U/ML

## 2021-03-30 PROCEDURE — 99072 ADDL SUPL MATRL&STAF TM PHE: CPT

## 2021-03-30 PROCEDURE — 99202 OFFICE O/P NEW SF 15 MIN: CPT

## 2021-03-30 RX ORDER — UBROGEPANT 50 MG/1
50 TABLET ORAL
Qty: 30 | Refills: 0 | Status: ACTIVE | COMMUNITY
Start: 2020-10-05

## 2021-03-30 RX ORDER — ELETRIPTAN HYDROBROMIDE 40 MG/1
40 TABLET, FILM COATED ORAL
Qty: 10 | Refills: 0 | Status: ACTIVE | COMMUNITY
Start: 2020-05-07

## 2021-03-30 NOTE — PAST MEDICAL HISTORY
[Postmenopausal] : The patient is postmenopausal [Menarche Age ____] : age at menarche was [unfilled] [Menopause Age____] : age at menopause was [unfilled] [History of Hormone Replacement Treatment] : has no history of hormone replacement treatment [Total Preg ___] : G[unfilled] [Live Births ___] : P[unfilled]  [Age At Live Birth ___] : Age at live birth: [unfilled] [FreeTextEntry6] : No. [FreeTextEntry7] : Yes; 5 years. [FreeTextEntry8] : Yes.

## 2021-03-30 NOTE — REVIEW OF SYSTEMS
[Breast Pain] : no breast pain [Breast Lump] : no breast lump [Nipple Discharge] : no nipple discharge [Nipple Inverted] : no inversion of the nipple [Negative] : Constitutional

## 2021-03-30 NOTE — ASSESSMENT
[FreeTextEntry1] : JONO is a oziel 51 year old patient who presented today for initial consultation.\par She was referred by her gynecologist for imaging review.\par She offers no current breast related complaints. \par Imaging of the left breast was done recently which revealed vval, circumscribed, hypoechoic mass in the left breast, 12:00 axis, 6 cm from the nipple is stable in size from 7/7/2020, measuring up to 0.5 cm, but with increased internal echoes. This likely represents a complicated cyst and is probably benign, as detailed above. \par \par On physical exam, there are no discrete masses in either breast or axilla.  There is no nipple discharge or inversion bilaterally.  There are no skin changes bilaterally.  \par \par We discussed BIRADS 3 lesions. These lesions have a 2% chance of harboring malignancy. There is always an option to obtain a tissue sample with a biopsy to confirm the diagnosis. The procedure was described in detail including the placement of a tissue marker clip. The risks of the procedure, including but not limited to bleeding and infection were also explained. She is not interested in biopsy at this time and agrees to continue with short-term interval imaging, which is traditionally performed at six-month intervals for approximately two years to establish stability.\par \par Follow-up imaging with a bilateral diagnostic mammogram and sonogram will be due in July 2021 for short-term imaging, and that will be scheduled today. \par She will return for follow-up and clinical breast exam in July 2021 as well.\par \par I spent a total of 20 minutes of face to face time with this patient, greater than 50% of which was spent in counseling and/or coordination of care.\par All of her questions were appropriately answered.\par She knows to call with any concerns.\par

## 2021-03-30 NOTE — HISTORY OF PRESENT ILLNESS
[FreeTextEntry1] : PCP: Dr. Jones Hilario\par GYN: Dr. Nila Lewis\par \par JONO BERNABE is a 51 year old female patient who presents today for initial consultation.\par She was referred by her gynecologist for imaging review.\par \par She denies any palpable lumps, analia pain, nipple discharge or inversion and / or skin changes.\par \par Pt has hx of benign right breast biopsy.\par \par (-) family hx - breast / ovarian cancer.\par \par Sharmin Model Risk Assessment:\par 5 yr - 1.2% \par Life - 9.0%

## 2021-03-30 NOTE — DATA REVIEWED
[FreeTextEntry1] : B/L Dx Mammo & Sono - 07/07/2020:\par Breast composition: There are scattered areas of fibroglandular density.\par Findings: \par Mammogram:\par Stable lateral left breast mass short-term interval follow-up recommended. No new suspicious mass, microvascular architectural distortion.\par No suspicious mass, microcalcifications or areas of architectural distortion is seen the right breast. \par Ultrasound:\par Targeted unilateral left breast ultrasound was performed. \par Left breast:\par At 12:00 N cyst corresponding to mammographic finding there is an oval circumscribed mass measuring 0.6 x 0.3 x 0.3 cm. Short interval follow-up recommended.\par Impression: Stable left breast mass as described above. Short interval follow-up with ultrasound and mammogram recommended.\par Continued annual screening right breast.\par Recommendation: Follow-up unilateral diagnostic mammogram and ultrasound in 6 months.\par BI-RADS category 3: Probably Benign\par \par Left Uni Dx Mammo & Sono - 01/11/2021:\par BREAST COMPOSITION: There are scattered areas of fibroglandular density.\par VIEWS: 2-D/tomosynthesis CC/MLO views of the left breast. Computer-aided detection was utilized by the radiologists in the interpretation of this examination.\par FINDINGS:\par MAMMOGRAM:\par Scattered circumscribed masses in the left breast are stable since 4/11/2019. Specifically, mass in the left lateral breast is stable from 4/11/2019. No suspicious masses, calcifications, or areas of architectural distortion are seen in the left breast. There has been no significant interval change from the prior study.\par ULTRASOUND:\par Targeted left breast ultrasound was performed.\par Oval, circumscribed, hypoechoic mass in the left breast, 12:00 axis, 6 cm from the nipple is stable in size from 7/7/2020, measuring up to 0.5 cm, but with increased internal echoes. This likely represents a complicated cyst and is probably benign. Continued follow-up is recommended.\par IMPRESSION:\par Stable probably benign left breast masses as above. Continued mammographic/sonographic follow-up is recommended in 6 months.\par Recommendation: Follow-up bilateral diagnostic mammogram and ultrasound in 6 months.\par BI-RADS category 3: Probably Benign

## 2021-07-16 ENCOUNTER — RESULT REVIEW (OUTPATIENT)
Age: 51
End: 2021-07-16

## 2021-07-16 ENCOUNTER — OUTPATIENT (OUTPATIENT)
Dept: OUTPATIENT SERVICES | Facility: HOSPITAL | Age: 51
LOS: 1 days | Discharge: HOME | End: 2021-07-16
Payer: COMMERCIAL

## 2021-07-16 DIAGNOSIS — R92.8 OTHER ABNORMAL AND INCONCLUSIVE FINDINGS ON DIAGNOSTIC IMAGING OF BREAST: ICD-10-CM

## 2021-07-16 DIAGNOSIS — Z90.49 ACQUIRED ABSENCE OF OTHER SPECIFIED PARTS OF DIGESTIVE TRACT: Chronic | ICD-10-CM

## 2021-07-16 PROCEDURE — 77066 DX MAMMO INCL CAD BI: CPT | Mod: 26

## 2021-07-16 PROCEDURE — 76642 ULTRASOUND BREAST LIMITED: CPT | Mod: 26,LT

## 2021-07-16 PROCEDURE — G0279: CPT | Mod: 26

## 2021-07-21 ENCOUNTER — APPOINTMENT (OUTPATIENT)
Dept: BREAST CENTER | Facility: CLINIC | Age: 51
End: 2021-07-21

## 2021-08-01 ENCOUNTER — TRANSCRIPTION ENCOUNTER (OUTPATIENT)
Age: 51
End: 2021-08-01

## 2022-01-31 ENCOUNTER — TRANSCRIPTION ENCOUNTER (OUTPATIENT)
Age: 52
End: 2022-01-31

## 2024-04-03 ENCOUNTER — OUTPATIENT (OUTPATIENT)
Dept: OUTPATIENT SERVICES | Facility: HOSPITAL | Age: 54
LOS: 1 days | End: 2024-04-03
Payer: COMMERCIAL

## 2024-04-03 DIAGNOSIS — Z13.820 ENCOUNTER FOR SCREENING FOR OSTEOPOROSIS: ICD-10-CM

## 2024-04-03 DIAGNOSIS — Z90.49 ACQUIRED ABSENCE OF OTHER SPECIFIED PARTS OF DIGESTIVE TRACT: Chronic | ICD-10-CM

## 2024-04-03 DIAGNOSIS — Z00.8 ENCOUNTER FOR OTHER GENERAL EXAMINATION: ICD-10-CM

## 2024-04-03 DIAGNOSIS — N64.4 MASTODYNIA: ICD-10-CM

## 2024-04-03 PROCEDURE — 77080 DXA BONE DENSITY AXIAL: CPT

## 2024-04-03 PROCEDURE — 77080 DXA BONE DENSITY AXIAL: CPT | Mod: 26

## 2024-04-03 PROCEDURE — G0279: CPT

## 2024-04-03 PROCEDURE — 77066 DX MAMMO INCL CAD BI: CPT | Mod: 26

## 2024-04-03 PROCEDURE — 76641 ULTRASOUND BREAST COMPLETE: CPT | Mod: 50

## 2024-04-03 PROCEDURE — G0279: CPT | Mod: 26

## 2024-04-03 PROCEDURE — 77066 DX MAMMO INCL CAD BI: CPT

## 2024-04-03 PROCEDURE — 76641 ULTRASOUND BREAST COMPLETE: CPT | Mod: 26,50

## 2024-04-04 DIAGNOSIS — Z13.820 ENCOUNTER FOR SCREENING FOR OSTEOPOROSIS: ICD-10-CM

## 2024-04-04 DIAGNOSIS — N64.4 MASTODYNIA: ICD-10-CM

## 2024-12-18 NOTE — ED ADULT NURSE NOTE - EXTENSIONS OF SELF_ADULT
----- Message from Gagan Frey MD sent at 12/18/2024  3:11 PM CST -----  Please call patient with lab results    Thyroid TSH is elevated.  Please ask her if she is taking the levothyroxine first thing in the morning on an empty stomach then waiting 1 hour prior to eating or drinking anything else    Lipid panel: Cholesterol is high.  Please ask her if she is taking Lipitor as prescribed.  Please advise her to reduce the following foods in her diet: Cookies, cake, ice cream, red meat, gravy    A1c is 10.7.  She is only taking Ozempic at this time.  I recommend starting metformin 500 mg 1 tablet daily for 1 week then 1 tablet in the morning and 1 tablet in the evening for 1 week and then 2 tablets in the morning and 2 tablets in the evening      Other labs are normal.     None

## 2025-06-21 NOTE — PATIENT PROFILE ADULT - FALL HARM RISK TYPE OF ASSESSMENT
Problem: At Risk for Falls  Goal: Patient does not fall  Outcome: Monitoring/Evaluating progress  Goal: Patient takes action to control fall-related risks  Outcome: Monitoring/Evaluating progress     Problem: Impaired Physical Mobility  Goal: Functional status is maintained or returned to baseline during hospitalization  Outcome: Monitoring/Evaluating progress  Goal: Tolerates activity for discharge setting with no abnormal symptoms  Outcome: Monitoring/Evaluating progress     Problem: Fluid Volume Excess  Goal: Fluid Volume Excess Symptoms Resolved  Description: Treatment often consists of oxygen and respiratory support with diuretic therapy at doses that exceed usual dose (typically doubled).  Monitor patient response to treatment.  Acute dyspnea should resolve quickly if dose is adequate and kidney function is adequate. Dyspnea/SOB should only be observed with Activity after effective treatment. Patient should be able to lie down comfortably, without SOB.  Outcome: Monitoring/Evaluating progress  Goal: Oxygenation is maintained (SpO2 greater than or equal to 90% or as ordered)  Outcome: Monitoring/Evaluating progress      admission